# Patient Record
Sex: MALE | Race: WHITE | NOT HISPANIC OR LATINO | Employment: FULL TIME | URBAN - METROPOLITAN AREA
[De-identification: names, ages, dates, MRNs, and addresses within clinical notes are randomized per-mention and may not be internally consistent; named-entity substitution may affect disease eponyms.]

---

## 2018-08-22 ENCOUNTER — TRANSCRIBE ORDERS (OUTPATIENT)
Dept: URGENT CARE | Facility: CLINIC | Age: 28
End: 2018-08-22

## 2018-08-22 ENCOUNTER — APPOINTMENT (OUTPATIENT)
Dept: RADIOLOGY | Facility: CLINIC | Age: 28
End: 2018-08-22
Payer: OTHER MISCELLANEOUS

## 2018-08-22 ENCOUNTER — APPOINTMENT (OUTPATIENT)
Dept: URGENT CARE | Facility: CLINIC | Age: 28
End: 2018-08-22
Payer: OTHER MISCELLANEOUS

## 2018-08-22 DIAGNOSIS — S99.912A ANKLE INJURY, LEFT, INITIAL ENCOUNTER: ICD-10-CM

## 2018-08-22 DIAGNOSIS — S99.922A FOOT INJURY, LEFT, INITIAL ENCOUNTER: Primary | ICD-10-CM

## 2018-08-22 PROCEDURE — 99283 EMERGENCY DEPT VISIT LOW MDM: CPT | Performed by: FAMILY MEDICINE

## 2018-08-22 PROCEDURE — G0382 LEV 3 HOSP TYPE B ED VISIT: HCPCS | Performed by: FAMILY MEDICINE

## 2018-08-22 PROCEDURE — 73610 X-RAY EXAM OF ANKLE: CPT

## 2018-08-27 ENCOUNTER — APPOINTMENT (OUTPATIENT)
Dept: URGENT CARE | Facility: CLINIC | Age: 28
End: 2018-08-27
Payer: OTHER MISCELLANEOUS

## 2018-08-27 PROCEDURE — 99213 OFFICE O/P EST LOW 20 MIN: CPT | Performed by: PREVENTIVE MEDICINE

## 2021-05-05 ENCOUNTER — IMMUNIZATIONS (OUTPATIENT)
Dept: FAMILY MEDICINE CLINIC | Facility: HOSPITAL | Age: 31
End: 2021-05-05

## 2021-05-05 DIAGNOSIS — Z23 ENCOUNTER FOR IMMUNIZATION: Primary | ICD-10-CM

## 2021-05-05 PROCEDURE — 91300 SARS-COV-2 / COVID-19 MRNA VACCINE (PFIZER-BIONTECH) 30 MCG: CPT

## 2021-05-05 PROCEDURE — 0001A SARS-COV-2 / COVID-19 MRNA VACCINE (PFIZER-BIONTECH) 30 MCG: CPT

## 2021-05-29 ENCOUNTER — IMMUNIZATIONS (OUTPATIENT)
Dept: FAMILY MEDICINE CLINIC | Facility: HOSPITAL | Age: 31
End: 2021-05-29

## 2021-05-29 DIAGNOSIS — Z23 ENCOUNTER FOR IMMUNIZATION: Primary | ICD-10-CM

## 2021-05-29 PROCEDURE — 0002A SARS-COV-2 / COVID-19 MRNA VACCINE (PFIZER-BIONTECH) 30 MCG: CPT

## 2021-05-29 PROCEDURE — 91300 SARS-COV-2 / COVID-19 MRNA VACCINE (PFIZER-BIONTECH) 30 MCG: CPT

## 2021-09-10 ENCOUNTER — TELEPHONE (OUTPATIENT)
Dept: DERMATOLOGY | Facility: CLINIC | Age: 31
End: 2021-09-10

## 2022-04-21 NOTE — TELEPHONE ENCOUNTER
Pt sent request for a new pt appt via online, I called the pt but n/a so Ieft him a vm with our contact info   herb
Calm

## 2024-09-18 ENCOUNTER — TELEPHONE (OUTPATIENT)
Age: 34
End: 2024-09-18

## 2024-09-18 ENCOUNTER — OFFICE VISIT (OUTPATIENT)
Dept: URGENT CARE | Facility: CLINIC | Age: 34
End: 2024-09-18
Payer: COMMERCIAL

## 2024-09-18 VITALS
WEIGHT: 190 LBS | OXYGEN SATURATION: 99 % | HEART RATE: 81 BPM | TEMPERATURE: 98.6 F | RESPIRATION RATE: 16 BRPM | HEIGHT: 68 IN | SYSTOLIC BLOOD PRESSURE: 144 MMHG | BODY MASS INDEX: 28.79 KG/M2 | DIASTOLIC BLOOD PRESSURE: 86 MMHG

## 2024-09-18 DIAGNOSIS — M54.42 ACUTE LEFT-SIDED LOW BACK PAIN WITH LEFT-SIDED SCIATICA: Primary | ICD-10-CM

## 2024-09-18 PROCEDURE — 96372 THER/PROPH/DIAG INJ SC/IM: CPT

## 2024-09-18 PROCEDURE — 99213 OFFICE O/P EST LOW 20 MIN: CPT

## 2024-09-18 RX ORDER — METHYLPREDNISOLONE 4 MG
TABLET, DOSE PACK ORAL
Qty: 21 TABLET | Refills: 0 | Status: SHIPPED | OUTPATIENT
Start: 2024-09-18

## 2024-09-18 RX ORDER — KETOROLAC TROMETHAMINE 30 MG/ML
30 INJECTION, SOLUTION INTRAMUSCULAR; INTRAVENOUS ONCE
Status: COMPLETED | OUTPATIENT
Start: 2024-09-18 | End: 2024-09-18

## 2024-09-18 RX ORDER — CYCLOBENZAPRINE HCL 10 MG
10 TABLET ORAL
Qty: 7 TABLET | Refills: 0 | Status: SHIPPED | OUTPATIENT
Start: 2024-09-18

## 2024-09-18 RX ORDER — LIDOCAINE 50 MG/G
1 PATCH TOPICAL DAILY
Qty: 9 PATCH | Refills: 0 | Status: SHIPPED | OUTPATIENT
Start: 2024-09-18

## 2024-09-18 RX ADMIN — KETOROLAC TROMETHAMINE 30 MG: 30 INJECTION, SOLUTION INTRAMUSCULAR; INTRAVENOUS at 18:15

## 2024-09-18 NOTE — PROGRESS NOTES
[facility]        NAME: Major Mesa is a 34 y.o. male  : 1990    MRN: [id number]  DATE: 2024  TIME: 6:15 PM    Assessment and Plan   Acute left-sided low back pain with left-sided sciatica [M54.42]  1. Acute left-sided low back pain with left-sided sciatica  ketorolac (TORADOL) injection 30 mg    lidocaine (Lidoderm) 5 %    cyclobenzaprine (FLEXERIL) 10 mg tablet    methylPREDNISolone 4 MG tablet therapy pack    Ambulatory Referral to [facility]            Patient Instructions     You have been given a Toradol shot today. This is an anti-inflammatory medication, similar to ibuprofen. Do not take any other NSAIDs for the next 8 hours. (EX: ibuprofen, Aleve, Motrin, Advil).     You may take Tylenol.     Start steroids tomorrow.    Take Flexeril as prescribed. Do not drive or operate heavy machinery after taking this medication as it will cause drowsiness. Do not drink alcohol while taking this medication.     Alternate ice and heating pad. Gentle stretching after heating pad application.    Can also try Lidoderm patches, Icy Hot cream, Voltaren gel.    Avoid heavy lifting.     Please call and schedule appointment with [facility].     Go to the ED if you develop any new or worsening symptoms such as fever, chest pain, shortness of breath, vomiting, increased pain, lower extremity weakness, or loss of bowel/bladder control.     If tests are performed, our office will contact you with results only if changes need to made to the care plan discussed with you at the visit. [facility]      Chief Complaint     Chief Complaint   Patient presents with    Back Pain     Pt reports left lower back pain radiating down his left leg to his knee. Denies injury. Reports he woke up with the pain yesterday. Applying heat/ice, took advil. Needs a work note.         History of Present Illness       34-year-old male presenting with  left lower back pain that started one week ago.  No known injury or incident prior to symptom onset.  States he woke up yesterday and pain was worse.  Located in left lower back and radiates into buttocks and anterior thigh.   Described as aching pains that occasionally shoot.   Denies fevers, headaches, dizziness, neck pain, CP, SOB, abdominal pain, NVD, saddle anesthesia, bladder/bowel incontinence, and numbness/tingling/weakness in either leg.  Taking Advil and alternating heat with ice application.  Requesting work note.        Review of Systems   Review of Systems   Constitutional:  Negative for chills and fever.   Respiratory:  Negative for chest tightness and shortness of breath.    Cardiovascular:  Negative for chest pain and palpitations.   Gastrointestinal:  Negative for abdominal pain, diarrhea, nausea and vomiting.   Musculoskeletal:  Positive for back pain. Negative for joint swelling, myalgias, neck pain and neck stiffness.   Skin:  Negative for rash and wound.   Neurological:  Negative for dizziness, weakness, light-headedness, numbness and headaches.         Current Medications       Current Outpatient Medications:     cyclobenzaprine (FLEXERIL) 10 mg tablet, Take 1 tablet (10 mg total) by mouth daily at bedtime, Disp: 7 tablet, Rfl: 0    lidocaine (Lidoderm) 5 %, Apply 1 patch topically over 12 hours daily Remove & Discard patch within 12 hours or as directed by MD, Disp: 9 patch, Rfl: 0    methylPREDNISolone 4 MG tablet therapy pack, Use as directed on package, Disp: 21 tablet, Rfl: 0    Current Facility-Administered Medications:     ketorolac (TORADOL) injection 30 mg, 30 mg, Intramuscular, Once,     Current Allergies     Allergies as of 09/18/2024    (No Known Allergies)            The following portions of the patient's history were reviewed and updated as appropriate: allergies, current medications, past family history, past medical history, past social history, past surgical history and  "problem list.     History reviewed. No pertinent past medical history.    History reviewed. No pertinent surgical history.    History reviewed. No pertinent family history.      Medications have been verified.        Objective   /86   Pulse 81   Temp 98.6 °F (37 °C)   Resp 16   Ht 5' 8\" (1.727 m)   Wt 86.2 kg (190 lb) Comment: stated  SpO2 99%   BMI 28.89 kg/m²        Physical Exam     Physical Exam  Vitals and nursing note reviewed.   Constitutional:       General: He is not in acute distress.     Appearance: He is not ill-appearing.   HENT:      Head: Normocephalic and atraumatic.      Mouth/Throat:      Mouth: Mucous membranes are moist.   Cardiovascular:      Rate and Rhythm: Normal rate and regular rhythm.      Pulses: Normal pulses.      Heart sounds: Normal heart sounds.   Pulmonary:      Effort: Pulmonary effort is normal.      Breath sounds: Normal breath sounds.   Musculoskeletal:      Cervical back: Normal range of motion and neck supple. No tenderness. No pain with movement.      Thoracic back: No tenderness.      Lumbar back: Tenderness (left lumbar paraspinals) present. Decreased range of motion. Negative right straight leg raise test and negative left straight leg raise test.   Skin:     General: Skin is warm and dry.      Capillary Refill: Capillary refill takes less than 2 seconds.   Neurological:      Mental Status: He is alert and oriented to person, place, and time.                   "

## 2024-09-18 NOTE — LETTER
September 18, 2024     Patient: Major Mesa   YOB: 1990   Date of Visit: 9/18/2024       To Whom it May Concern:    Major Mesa was seen in my clinic on 9/18/2024. He may return to work on 9/20/2024 .    If you have any questions or concerns, please don't hesitate to call.         Sincerely,          KIEL Rasmussen        CC: No Recipients

## 2024-09-18 NOTE — PATIENT INSTRUCTIONS
You have been given a Toradol shot today. This is an anti-inflammatory medication, similar to ibuprofen. Do not take any other NSAIDs for the next 8 hours. (EX: ibuprofen, Aleve, Motrin, Advil).     You may take Tylenol.     Start steroids tomorrow.    Take Flexeril as prescribed. Do not drive or operate heavy machinery after taking this medication as it will cause drowsiness. Do not drink alcohol while taking this medication.     Alternate ice and heating pad. Gentle stretching after heating pad application.    Can also try Lidoderm patches, Icy Hot cream, Voltaren gel.    Avoid heavy lifting.     Please call and schedule appointment with [facility].     Go to the ED if you develop any new or worsening symptoms such as fever, chest pain, shortness of breath, vomiting, increased pain, lower extremity weakness, or loss of bowel/bladder control.

## 2024-09-18 NOTE — TELEPHONE ENCOUNTER
Pt's spouse called to schedule pt ASAP, he hurt his back and would like a provider to take a look. Scheduled new patient appt for 9/26; please put pt on cancellation list and contact either of them back if anything opens up with any provider, thank you.

## 2024-09-19 ENCOUNTER — TELEPHONE (OUTPATIENT)
Dept: PHYSICAL THERAPY | Facility: OTHER | Age: 34
End: 2024-09-19

## 2024-09-19 NOTE — TELEPHONE ENCOUNTER
Call placed to the patient per [facility] referral.    Spoke with the patient he is not interested in PT at this time    [facility] closed